# Patient Record
Sex: MALE | Race: WHITE | NOT HISPANIC OR LATINO
[De-identification: names, ages, dates, MRNs, and addresses within clinical notes are randomized per-mention and may not be internally consistent; named-entity substitution may affect disease eponyms.]

---

## 2021-04-06 ENCOUNTER — DASHBOARD ENCOUNTERS (OUTPATIENT)
Age: 56
End: 2021-04-06

## 2021-04-06 ENCOUNTER — LAB OUTSIDE AN ENCOUNTER (OUTPATIENT)
Dept: URBAN - METROPOLITAN AREA CLINIC 19 | Facility: CLINIC | Age: 56
End: 2021-04-06

## 2021-04-06 ENCOUNTER — OFFICE VISIT (OUTPATIENT)
Dept: URBAN - METROPOLITAN AREA CLINIC 19 | Facility: CLINIC | Age: 56
End: 2021-04-06
Payer: COMMERCIAL

## 2021-04-06 VITALS
WEIGHT: 201 LBS | HEIGHT: 70 IN | SYSTOLIC BLOOD PRESSURE: 125 MMHG | BODY MASS INDEX: 28.77 KG/M2 | TEMPERATURE: 98.5 F | DIASTOLIC BLOOD PRESSURE: 81 MMHG

## 2021-04-06 DIAGNOSIS — Z86.010 HISTORY OF COLON POLYPS: ICD-10-CM

## 2021-04-06 DIAGNOSIS — K62.89 RECTAL PAIN: ICD-10-CM

## 2021-04-06 DIAGNOSIS — K59.09 CHRONIC CONSTIPATION: ICD-10-CM

## 2021-04-06 PROCEDURE — 99244 OFF/OP CNSLTJ NEW/EST MOD 40: CPT | Performed by: INTERNAL MEDICINE

## 2021-04-06 PROCEDURE — 99204 OFFICE O/P NEW MOD 45 MIN: CPT | Performed by: INTERNAL MEDICINE

## 2021-04-06 RX ORDER — SODIUM, POTASSIUM,MAG SULFATES 17.5-3.13G
354ML SOLUTION, RECONSTITUTED, ORAL ORAL
Qty: 354 MILLILITER | Refills: 0 | OUTPATIENT
Start: 2021-04-06 | End: 2021-04-07

## 2021-04-06 NOTE — PHYSICAL EXAM GASTROINTESTINAL
Abdomen, soft, nontender, nondistended, no guarding or rigidity, no masses palpable, normal bowel sounds, Liver and Spleen, no hepatomegaly present, no hepatosplenomegaly,  Rectal, exam offered to pain, but patient deferred rectal exam due to pain

## 2021-04-06 NOTE — HPI-TODAY'S VISIT:
The patient was referred by Bea Chen NP for rectal pain .   A copy of this document is being forwarded to the referring provider.   Mr. Figueroa is a 55 year-old male who presents today for rectal pain, which hurts most after bowel movements. He reports he has been having this pain for 3-4 weeks.  He denies abdominal pain. He has not tried any medications. He last had a colonoscopy in 2015 where he had one tubular adenoma polyp found in the descending colon. He was told to return in 3 years. Internal hemorrhoids were also seen. He reports they have never bothered him before. He denies history of anal fissures. He currently feels constipated. He normally has a bowel movement every day, but currently is having one every 2-3 days. He recently started trulicity for diabetes and he started a new diet on which he has lost over 30 lbs in 3 months. He has seen little drops of blood on the toilet paper recently. He denies dizziness, fatigue, and shortness of breath. He recently saw Ms. Chen who performed a rectal exam. He reported that she didn't feel any hemorrhoids and it was very painful. He denies blood thinners, defibrillator/pacemaker, kidney disease, and home O2, He is a diabetic.

## 2021-04-08 PROBLEM — 14760008: Status: ACTIVE | Noted: 2021-04-06

## 2021-04-08 PROBLEM — 428283002: Status: ACTIVE | Noted: 2021-04-06

## 2021-04-08 PROBLEM — 77880009: Status: ACTIVE | Noted: 2021-04-06

## 2021-04-14 ENCOUNTER — LAB OUTSIDE AN ENCOUNTER (OUTPATIENT)
Dept: URBAN - METROPOLITAN AREA CLINIC 19 | Facility: CLINIC | Age: 56
End: 2021-04-14

## 2021-04-14 ENCOUNTER — OFFICE VISIT (OUTPATIENT)
Dept: URBAN - METROPOLITAN AREA LAB 2 | Facility: LAB | Age: 56
End: 2021-04-14
Payer: COMMERCIAL

## 2021-04-14 DIAGNOSIS — D12.3 ADENOMA OF TRANSVERSE COLON: ICD-10-CM

## 2021-04-14 DIAGNOSIS — Z86.010 H/O ADENOMATOUS POLYP OF COLON: ICD-10-CM

## 2021-04-14 DIAGNOSIS — K62.1 DYSPLASTIC POLYP OF RECTUM: ICD-10-CM

## 2021-04-14 LAB
GLUCOSE POC: 65
GLUCOSE POC: 69
PERFORMING LAB: (no result)
PERFORMING LAB: (no result)

## 2021-04-14 PROCEDURE — 45380 COLONOSCOPY AND BIOPSY: CPT | Performed by: INTERNAL MEDICINE

## 2021-04-14 PROCEDURE — 45385 COLONOSCOPY W/LESION REMOVAL: CPT | Performed by: INTERNAL MEDICINE
